# Patient Record
Sex: FEMALE | Race: ASIAN | NOT HISPANIC OR LATINO | URBAN - NONMETROPOLITAN AREA
[De-identification: names, ages, dates, MRNs, and addresses within clinical notes are randomized per-mention and may not be internally consistent; named-entity substitution may affect disease eponyms.]

---

## 2023-03-20 ENCOUNTER — OFFICE VISIT (OUTPATIENT)
Dept: PRIMARY CARE | Facility: CLINIC | Age: 28
End: 2023-03-20
Payer: COMMERCIAL

## 2023-03-20 VITALS
BODY MASS INDEX: 19.07 KG/M2 | HEART RATE: 54 BPM | WEIGHT: 101 LBS | SYSTOLIC BLOOD PRESSURE: 110 MMHG | OXYGEN SATURATION: 99 % | HEIGHT: 61 IN | DIASTOLIC BLOOD PRESSURE: 64 MMHG

## 2023-03-20 DIAGNOSIS — K59.00 CONSTIPATION, UNSPECIFIED CONSTIPATION TYPE: ICD-10-CM

## 2023-03-20 DIAGNOSIS — K62.5 RECTAL BLEEDING: ICD-10-CM

## 2023-03-20 DIAGNOSIS — T73.2XXS FATIGUE DUE TO EXPOSURE, SEQUELA: Primary | ICD-10-CM

## 2023-03-20 PROBLEM — T73.2XXA FATIGUE DUE TO EXPOSURE: Status: ACTIVE | Noted: 2023-03-20

## 2023-03-20 PROCEDURE — 1036F TOBACCO NON-USER: CPT | Performed by: INTERNAL MEDICINE

## 2023-03-20 PROCEDURE — 99203 OFFICE O/P NEW LOW 30 MIN: CPT | Performed by: INTERNAL MEDICINE

## 2023-03-20 RX ORDER — NORGESTIMATE AND ETHINYL ESTRADIOL 7DAYSX3 28
1 KIT ORAL DAILY
COMMUNITY
Start: 2023-01-04

## 2023-03-20 ASSESSMENT — ENCOUNTER SYMPTOMS
ABDOMINAL PAIN: 0
UNEXPECTED WEIGHT CHANGE: 0
PALPITATIONS: 0
WHEEZING: 0
LIGHT-HEADEDNESS: 1
BLOOD IN STOOL: 0
CHEST TIGHTNESS: 0
BACK PAIN: 0
FATIGUE: 1
COUGH: 0
NAUSEA: 0
ARTHRALGIAS: 0
VOMITING: 0
SHORTNESS OF BREATH: 0
DIARRHEA: 0

## 2023-03-20 NOTE — PROGRESS NOTES
Pt is here today to get est with new PCP has C/O rectal bleeding for the last few months. Would like to talk about getting pap test done.

## 2023-03-20 NOTE — ASSESSMENT & PLAN NOTE
She has had some rectal bleeding for the past 4 months and therefore I am referring her for a colonoscopy

## 2023-03-20 NOTE — PATIENT INSTRUCTIONS
As we discussed because of your history of bleeding and fatigue I am asking that you go for lab work at your earliest convenience.  In the lab work there is a blood sugar so ideally we would like for you to be fasting.  Fasting for me means eating nothing with calories for at least 8 hours.  You are welcome to have all the water that you want and in fact I encourage drinking lots of water before your lab draw.  You can even have coffee as long as you do not put sugar cream and it  I am also recommending that you start a daily fiber supplement such as Metamucil.  Metamucil is the name brand for a fiber derivative called psyllium.  You can either get the powder you mix in water or take capsules.  Please take the recommended once daily dose starting tomorrow every day until I see you next time  I am referring you to Dr. Megan Sippy a general surgeon in [facility] for evaluation of your rectal bleeding.  Because you have had it for so long I am pretty sure that she will be recommending a colonoscopy.  I will see you back in follow-up

## 2023-03-20 NOTE — PROGRESS NOTES
"Subjective   Patient ID: Marjorie Gilliland is a 27 y.o. female who presents for No chief complaint on file..    HPI   She is here today to get established and explains that over the past 4 months she has noticed some bright red rectal bleeding.  She states that recently it has seemingly stopped but obviously she wants to get checked out.  She also complains of some fatigue.  She also states that she is scheduled to get in for a Pap exam in the near future.  We did conduct a review of systems and we also went over her past medical history.  She is taking no over-the-counter NSAIDs but is on an oral contraceptive.  We discussed investigating her symptoms further and I will be having her go for lab work.  She also understands that I am recommending she have a colonoscopy and she is agreeable.  We will see her back in follow-up.  Review of Systems   Constitutional:  Positive for fatigue. Negative for unexpected weight change.   HENT:  Positive for congestion.    Respiratory:  Negative for cough, chest tightness, shortness of breath and wheezing.    Cardiovascular:  Negative for chest pain, palpitations and leg swelling.   Gastrointestinal:  Negative for abdominal pain, blood in stool, diarrhea, nausea and vomiting.   Genitourinary:  Negative for pelvic pain and vaginal bleeding.   Musculoskeletal:  Negative for arthralgias and back pain.   Neurological:  Positive for light-headedness.   Objective   /64   Pulse 54   Ht 1.549 m (5' 1\")   Wt 45.8 kg (101 lb)   SpO2 99%   BMI 19.08 kg/m²     Physical Exam  Vitals and nursing note reviewed.   Constitutional:       General: She is not in acute distress.     Appearance: Normal appearance.   HENT:      Head: Normocephalic and atraumatic.   Eyes:      Conjunctiva/sclera: Conjunctivae normal.   Cardiovascular:      Rate and Rhythm: Normal rate and regular rhythm.      Heart sounds: Normal heart sounds.   Pulmonary:      Effort: No respiratory distress.      Breath " sounds: No wheezing.   Abdominal:      Palpations: Abdomen is soft.      Tenderness: There is no abdominal tenderness. There is no guarding.   Musculoskeletal:         General: No swelling. Normal range of motion.   Skin:     General: Skin is warm and dry.   Neurological:      General: No focal deficit present.      Mental Status: She is alert and oriented to person, place, and time.   Psychiatric:         Behavior: Behavior normal.         Assessment/Plan   Problem List Items Addressed This Visit          Digestive    Rectal bleeding     She has had some rectal bleeding for the past 4 months and therefore I am referring her for a colonoscopy         Relevant Orders    Referral to General Surgery    Constipation     I am recommending that she start a fiber derivative daily until her next follow-up appointment with me            Other    Fatigue due to exposure - Primary     I am sending her for laboratory testing to include a CBC, TSH, and CMP         Relevant Orders    Comprehensive Metabolic Panel    CBC and Auto Differential    TSH

## 2023-03-20 NOTE — ASSESSMENT & PLAN NOTE
I am recommending that she start a fiber derivative daily until her next follow-up appointment with me

## 2023-05-01 ENCOUNTER — HOSPITAL ENCOUNTER (OUTPATIENT)
Dept: DATA CONVERSION | Facility: HOSPITAL | Age: 28
End: 2023-05-01
Attending: SURGERY | Admitting: SURGERY
Payer: COMMERCIAL

## 2023-05-01 DIAGNOSIS — K59.00 CONSTIPATION, UNSPECIFIED: ICD-10-CM

## 2023-05-01 DIAGNOSIS — K64.4 RESIDUAL HEMORRHOIDAL SKIN TAGS: ICD-10-CM

## 2023-05-01 DIAGNOSIS — K62.5 HEMORRHAGE OF ANUS AND RECTUM: ICD-10-CM

## 2023-05-18 ENCOUNTER — OFFICE VISIT (OUTPATIENT)
Dept: PRIMARY CARE | Facility: CLINIC | Age: 28
End: 2023-05-18
Payer: COMMERCIAL

## 2023-05-18 VITALS
DIASTOLIC BLOOD PRESSURE: 66 MMHG | WEIGHT: 98.2 LBS | BODY MASS INDEX: 18.54 KG/M2 | SYSTOLIC BLOOD PRESSURE: 112 MMHG | OXYGEN SATURATION: 99 % | HEIGHT: 61 IN | HEART RATE: 81 BPM

## 2023-05-18 DIAGNOSIS — K59.00 CONSTIPATION, UNSPECIFIED CONSTIPATION TYPE: ICD-10-CM

## 2023-05-18 DIAGNOSIS — K62.5 RECTAL BLEEDING: Primary | ICD-10-CM

## 2023-05-18 PROBLEM — T73.2XXA FATIGUE DUE TO EXPOSURE: Status: RESOLVED | Noted: 2023-03-20 | Resolved: 2023-05-18

## 2023-05-18 PROCEDURE — 1036F TOBACCO NON-USER: CPT | Performed by: INTERNAL MEDICINE

## 2023-05-18 PROCEDURE — 99213 OFFICE O/P EST LOW 20 MIN: CPT | Performed by: INTERNAL MEDICINE

## 2023-05-18 ASSESSMENT — ENCOUNTER SYMPTOMS
BACK PAIN: 0
ARTHRALGIAS: 0
NAUSEA: 0
DIARRHEA: 0
CHEST TIGHTNESS: 0
PALPITATIONS: 0
ABDOMINAL PAIN: 0
VOMITING: 0
BLOOD IN STOOL: 0
FATIGUE: 0
SHORTNESS OF BREATH: 0
COUGH: 0
WHEEZING: 0

## 2023-05-18 NOTE — PROGRESS NOTES
Subjective   Patient ID: Marjorie Gilliland is a 28 y.o. female who presents for No chief complaint on file..  HPI  She is here today for follow-up.  Since her last visit she was able to complete her colonoscopy and they did not find anything of a serious nature.  She states there were no polyps and they did no biopsies.  Unfortunately just a couple of days after her procedure she started experiencing some rectal bleeding.  She states she was able to see that there is some sort of tear and she did call the gastroenterologist.  She will be seeing them tomorrow for a follow-up.  We also discussed constipation and she is taking the Metamucil regularly.  We also reviewed her most recent laboratory test results and I was pleased to inform her that everything is looking normal.  We did conduct a review of systems and we discussed the importance of getting in for routine Pap exams but otherwise as long as she is doing well we will see her back on an as-needed basis  Review of Systems   Constitutional:  Negative for fatigue.   Respiratory:  Negative for cough, chest tightness, shortness of breath and wheezing.    Cardiovascular:  Negative for chest pain, palpitations and leg swelling.   Gastrointestinal:  Negative for abdominal pain, blood in stool, diarrhea, nausea and vomiting.   Musculoskeletal:  Negative for arthralgias and back pain.     Objective   Physical Exam  Vitals and nursing note reviewed.   Constitutional:       General: She is not in acute distress.     Appearance: Normal appearance.   HENT:      Head: Normocephalic and atraumatic.   Eyes:      Conjunctiva/sclera: Conjunctivae normal.   Cardiovascular:      Rate and Rhythm: Normal rate and regular rhythm.      Heart sounds: Normal heart sounds.   Pulmonary:      Effort: No respiratory distress.      Breath sounds: No wheezing.   Abdominal:      Palpations: Abdomen is soft.      Tenderness: There is no abdominal tenderness. There is no guarding.    Musculoskeletal:         General: No swelling. Normal range of motion.   Skin:     General: Skin is warm and dry.   Neurological:      General: No focal deficit present.      Mental Status: She is alert and oriented to person, place, and time.   Psychiatric:         Behavior: Behavior normal.       Assessment/Plan   Problem List Items Addressed This Visit          Digestive    Rectal bleeding - Primary     -Recent colonoscopy showed no significant abnormalities and she does have a follow-up with gastroenterology tomorrow         Constipation     -She will continue with fiber therapy and we encouraged her to drink lots of water               Mignon Euceda, DO

## 2023-05-18 NOTE — PATIENT INSTRUCTIONS
Please do not hesitate to contact us if you are having any problems and please remember to get in for your regular Pap examination  We talked about the use of fiber therapy as well as Colace and MiraLAX.  She will avoid laxatives

## 2023-05-18 NOTE — ASSESSMENT & PLAN NOTE
-Recent colonoscopy showed no significant abnormalities and she does have a follow-up with gastroenterology tomorrow

## 2023-09-14 NOTE — H&P
History & Physical Reviewed:   Pregnant/Lactating:  ·  Are You Pregnant no   ·  Are You Currently Breastfeeding no     I have reviewed the History and Physical dated:  20-Apr-2023   History and Physical reviewed and relevant findings noted. Patient examined to review pertinent physical  findings.: No significant changes   Home Medications Reviewed: no changes noted   Allergies Reviewed: no changes noted       ERAS (Enhanced Recovery After Surgery):  ·  ERAS Patient: no     Consent:   COVID-19 Consent:  ·  COVID-19 Risk Consent Surgeon has reviewed key risks related to the risk of david COVID-19 and if they contract COVID-19 what the risks are.       Electronic Signatures:  Melissa Vo)  (Signed 01-May-2023 06:54)   Authored: History & Physical Reviewed, ERAS, Consent,  Note Completion      Last Updated: 01-May-2023 06:54 by Melissa Vo)

## 2023-11-06 ENCOUNTER — OFFICE VISIT (OUTPATIENT)
Dept: PRIMARY CARE | Facility: CLINIC | Age: 28
End: 2023-11-06
Payer: COMMERCIAL

## 2023-11-06 VITALS
SYSTOLIC BLOOD PRESSURE: 110 MMHG | DIASTOLIC BLOOD PRESSURE: 60 MMHG | BODY MASS INDEX: 18.97 KG/M2 | HEART RATE: 92 BPM | WEIGHT: 100.4 LBS | OXYGEN SATURATION: 99 %

## 2023-11-06 DIAGNOSIS — J40 BRONCHITIS: Primary | ICD-10-CM

## 2023-11-06 PROBLEM — R05.9 COUGH: Status: ACTIVE | Noted: 2023-10-20

## 2023-11-06 PROBLEM — K59.00 CONSTIPATION: Status: RESOLVED | Noted: 2023-03-20 | Resolved: 2023-11-06

## 2023-11-06 PROBLEM — K60.2 ANAL FISSURE: Status: RESOLVED | Noted: 2023-11-06 | Resolved: 2023-11-06

## 2023-11-06 PROCEDURE — 1036F TOBACCO NON-USER: CPT | Performed by: INTERNAL MEDICINE

## 2023-11-06 PROCEDURE — 99213 OFFICE O/P EST LOW 20 MIN: CPT | Performed by: INTERNAL MEDICINE

## 2023-11-06 RX ORDER — BENZONATATE 200 MG/1
200 CAPSULE ORAL 3 TIMES DAILY PRN
Qty: 42 CAPSULE | Refills: 0 | Status: SHIPPED | OUTPATIENT
Start: 2023-11-06 | End: 2023-11-21 | Stop reason: WASHOUT

## 2023-11-06 RX ORDER — METHYLPREDNISOLONE 4 MG/1
TABLET ORAL
Qty: 21 TABLET | Refills: 0 | Status: SHIPPED | OUTPATIENT
Start: 2023-11-06 | End: 2023-11-13

## 2023-11-06 RX ORDER — ALBUTEROL SULFATE 90 UG/1
2 AEROSOL, METERED RESPIRATORY (INHALATION) EVERY 4 HOURS PRN
COMMUNITY
Start: 2023-11-02 | End: 2023-12-02

## 2023-11-06 ASSESSMENT — ENCOUNTER SYMPTOMS
ARTHRALGIAS: 0
ABDOMINAL PAIN: 0
SINUS PAIN: 0
BLOOD IN STOOL: 0
WHEEZING: 0
SHORTNESS OF BREATH: 1
CHEST TIGHTNESS: 0
SORE THROAT: 0
VOMITING: 0
NAUSEA: 0
PALPITATIONS: 0
FATIGUE: 0
SPUTUM PRODUCTION: 1
RHINORRHEA: 1
FEVER: 0
COUGH: 0
BACK PAIN: 0
DIARRHEA: 0
SINUS PRESSURE: 0

## 2023-11-06 NOTE — PROGRESS NOTES
Subjective   Patient ID: Marjorie Gilliland is a 28 y.o. female who presents for No chief complaint on file..  Shortness of Breath  This is a new problem. The current episode started 1 to 4 weeks ago. The problem occurs constantly. The problem has been gradually improving. The average episode lasts 5 minutes. Associated symptoms include coryza, rhinorrhea and sputum production. Pertinent negatives include no abdominal pain, chest pain, ear pain, fever, leg swelling, sore throat, vomiting or wheezing. The symptoms are aggravated by any activity.   HISTORY PER PATIENT ABOVE  She is here today for evaluation of a persistent cough and some shortness of breath.  She explains that she developed some respiratory symptoms back on October 20 and she developed a high fever of 106.  She states she tried to wait it out but then eventually went to urgent care.  She was advised that she had a virus and was prescribed an albuterol inhaler.  In some ways her symptoms have improved with no longer experiencing fever and she is not producing any discolored mucus etc.  We did conduct a full review of systems based on today's evaluation I think she has a postinfectious cough from bronchitis.  She did do a COVID test once but we both agreed she likely had COVID.  On today's examination her oxygen level is excellent and her lung sounds are clear right now.  We discussed trying a Medrol Dosepak for inflammation and I am also giving her cough medicine.  If she does not get better in the next week or so she will call and we agreed that we can do a chest x-ray.  I will outline everything in a problem based format  Review of Systems   Constitutional:  Negative for fatigue and fever.   HENT:  Positive for congestion and rhinorrhea. Negative for ear pain, postnasal drip, sinus pressure, sinus pain, sneezing and sore throat.    Respiratory:  Positive for sputum production and shortness of breath. Negative for cough, chest tightness and  wheezing.    Cardiovascular:  Negative for chest pain, palpitations and leg swelling.   Gastrointestinal:  Negative for abdominal pain, blood in stool, diarrhea, nausea and vomiting.   Musculoskeletal:  Negative for arthralgias and back pain.     Objective   Physical Exam  Vitals and nursing note reviewed.   Constitutional:       General: She is not in acute distress.     Appearance: Normal appearance.   HENT:      Head: Normocephalic and atraumatic.   Eyes:      Conjunctiva/sclera: Conjunctivae normal.   Cardiovascular:      Rate and Rhythm: Normal rate and regular rhythm.      Heart sounds: Normal heart sounds.   Pulmonary:      Effort: No respiratory distress.      Breath sounds: No wheezing.   Abdominal:      Palpations: Abdomen is soft.      Tenderness: There is no abdominal tenderness. There is no guarding.   Musculoskeletal:         General: No swelling. Normal range of motion.   Skin:     General: Skin is warm and dry.   Neurological:      General: No focal deficit present.      Mental Status: She is alert and oriented to person, place, and time.   Psychiatric:         Behavior: Behavior normal.       Assessment/Plan   Problem List Items Addressed This Visit             ICD-10-CM    Bronchitis - Primary J40     -Symptoms began on October 20 with high fever  -I strongly suspect that she had a viral infection and possibly COVID  -Some of her symptoms have improved but she continues to have some feelings of shortness of breath and cough  -I am giving her a Medrol dose pack  -She has an albuterol inhaler  -I am giving her Tessalon for cough  -She will call if her condition does not continue to improve or does not resolve in the next 10 to 14 days as we would then pursue a chest x-ray         Relevant Medications    methylPREDNISolone (Medrol Dospak) 4 mg tablets    benzonatate (Tessalon) 200 mg capsule          Mignon Euceda DO

## 2023-11-06 NOTE — PATIENT INSTRUCTIONS
As we discussed I firmly believe that you had a recent viral respiratory infection and in some ways your condition has improved but I decided to give you a Medrol Dosepak which is an anti-inflammatory medication.  Will help with the inflammation in your lungs and hopefully help you get better quicker.  I also sent a prescription to your pharmacy for cough medicine called Tessalon and please use as directed.  You can also safely take over-the-counter Robitussin with this medicine if necessary and please also use your Proventil inhaler as it can help with bronchospasm.  Please contact me if your condition does not continue to improve and certainly call right away if you feel like it is getting worse.  We decided that if you had a persistent cough for the next 10 to 14 days you would call and we would do a chest x-ray at that time.

## 2023-11-06 NOTE — ASSESSMENT & PLAN NOTE
-Symptoms began on October 20 with high fever  -I strongly suspect that she had a viral infection and possibly COVID  -Some of her symptoms have improved but she continues to have some feelings of shortness of breath and cough  -I am giving her a Medrol dose pack  -She has an albuterol inhaler  -I am giving her Tessalon for cough  -She will call if her condition does not continue to improve or does not resolve in the next 10 to 14 days as we would then pursue a chest x-ray

## 2023-11-20 ENCOUNTER — LAB REQUISITION (OUTPATIENT)
Dept: LAB | Facility: HOSPITAL | Age: 28
End: 2023-11-20
Payer: COMMERCIAL

## 2023-11-20 DIAGNOSIS — J02.9 ACUTE SORE THROAT: Primary | ICD-10-CM

## 2023-11-20 DIAGNOSIS — J02.9 ACUTE PHARYNGITIS, UNSPECIFIED: ICD-10-CM

## 2023-11-20 PROCEDURE — 87651 STREP A DNA AMP PROBE: CPT

## 2023-11-21 ENCOUNTER — OFFICE VISIT (OUTPATIENT)
Dept: PRIMARY CARE | Facility: CLINIC | Age: 28
End: 2023-11-21
Payer: COMMERCIAL

## 2023-11-21 VITALS
DIASTOLIC BLOOD PRESSURE: 60 MMHG | HEART RATE: 84 BPM | SYSTOLIC BLOOD PRESSURE: 124 MMHG | WEIGHT: 97 LBS | BODY MASS INDEX: 18.33 KG/M2 | OXYGEN SATURATION: 98 %

## 2023-11-21 DIAGNOSIS — J02.9 ACUTE PHARYNGITIS, UNSPECIFIED ETIOLOGY: Primary | ICD-10-CM

## 2023-11-21 PROBLEM — K62.5 RECTAL BLEEDING: Status: RESOLVED | Noted: 2023-03-20 | Resolved: 2023-11-21

## 2023-11-21 PROBLEM — J40 BRONCHITIS: Status: RESOLVED | Noted: 2023-11-06 | Resolved: 2023-11-21

## 2023-11-21 LAB — S PYO DNA THROAT QL NAA+PROBE: NOT DETECTED

## 2023-11-21 PROCEDURE — 99213 OFFICE O/P EST LOW 20 MIN: CPT | Performed by: INTERNAL MEDICINE

## 2023-11-21 PROCEDURE — 1036F TOBACCO NON-USER: CPT | Performed by: INTERNAL MEDICINE

## 2023-11-21 RX ORDER — AMOXICILLIN 875 MG/1
875 TABLET, FILM COATED ORAL 2 TIMES DAILY
Qty: 20 TABLET | Refills: 0 | Status: SHIPPED | OUTPATIENT
Start: 2023-11-21 | End: 2023-12-01

## 2023-11-21 ASSESSMENT — ENCOUNTER SYMPTOMS
HOARSE VOICE: 1
NECK PAIN: 0
ABDOMINAL PAIN: 0
DIARRHEA: 0
HEADACHES: 1
STRIDOR: 0
COUGH: 1
FEVER: 1
SWOLLEN GLANDS: 0
SHORTNESS OF BREATH: 1
TROUBLE SWALLOWING: 0
FATIGUE: 1
VOMITING: 0
SORE THROAT: 1

## 2023-11-21 NOTE — PROGRESS NOTES
Answers submitted by the patient for this visit:  Sore Throat Questionnaire (Submitted on 11/21/2023)  Chief Complaint: Sore throat  Chronicity: new  Onset: in the past 7 days  Progression since onset: waxing and waning  Pain worse on: left  Fever: no fever  Fever duration: 1 to 2 days  Pain - numeric: 7/10  abdominal pain: No  congestion: Yes  cough: Yes  diarrhea: No  drooling: No  ear discharge: No  ear pain: No  headaches: Yes  hoarse voice: Yes  neck pain: No  plugged ear sensation: No  shortness of breath: Yes  stridor: No  swollen glands: No  trouble swallowing: No  vomiting: No  strep: No  mono: No

## 2023-11-21 NOTE — PATIENT INSTRUCTIONS
As we discussed we sent an antibiotic to your pharmacy called Amoxil to be taken twice daily with food for the next 10 days  For the sore throat we recommend Tylenol and you could also take ibuprofen but just use sparingly and make sure you also take that with food  Please call if your condition is worsening or does not resolve in the next couple of weeks

## 2023-11-21 NOTE — PROGRESS NOTES
Subjective   Patient ID: Marjorie Gilliland is a 28 y.o. female who presents for Sore Throat.  Sore Throat   This is a new problem. The current episode started in the past 7 days. The problem has been waxing and waning. The pain is worse on the left side. There has been no fever. The fever has been present for 1 to 2 days. The pain is at a severity of 7/10. Associated symptoms include congestion, coughing, headaches, a hoarse voice and shortness of breath. Pertinent negatives include no abdominal pain, diarrhea, drooling, ear discharge, ear pain, plugged ear sensation, neck pain, stridor, swollen glands, trouble swallowing or vomiting. She has had no exposure to strep or mono.   HISTORY PER PT ABOVE  She is here today with an intensely sore throat.  We are reminded that a couple of weeks ago she contracted COVID-19 infection and although she has had a lot of improvement in her symptoms she now has developed a red and intensely sore throat.  She states she is even had a fever with it.  We did conduct a review of systems and based on today's evaluation I do strongly suspect that she is suffering from acute bacterial pharyngitis.  I have decided to place her on amoxicillin and she will take that as directed for the next 10 days.  We also recommend symptomatic treatment with Tylenol and she will call if her condition is not improving or does not resolve as expected.  Review of Systems   Constitutional:  Positive for fatigue and fever.   HENT:  Positive for congestion, hoarse voice and sore throat. Negative for drooling, ear discharge, ear pain and trouble swallowing.    Respiratory:  Positive for cough and shortness of breath. Negative for stridor.    Gastrointestinal:  Negative for abdominal pain, diarrhea and vomiting.   Musculoskeletal:  Negative for neck pain.   Neurological:  Positive for headaches.     Objective   Physical Exam  Vitals and nursing note reviewed.   Constitutional:       General: She is not in  acute distress.     Appearance: Normal appearance.   HENT:      Head: Normocephalic and atraumatic.   Eyes:      Conjunctiva/sclera: Conjunctivae normal.   Cardiovascular:      Rate and Rhythm: Normal rate and regular rhythm.      Heart sounds: Normal heart sounds.   Pulmonary:      Effort: No respiratory distress.      Breath sounds: No wheezing.   Abdominal:      Palpations: Abdomen is soft.      Tenderness: There is no abdominal tenderness. There is no guarding.   Musculoskeletal:         General: No swelling. Normal range of motion.   Skin:     General: Skin is warm and dry.   Neurological:      General: No focal deficit present.      Mental Status: She is alert and oriented to person, place, and time.   Psychiatric:         Behavior: Behavior normal.       Assessment/Plan   Problem List Items Addressed This Visit             ICD-10-CM    Acute pharyngitis - Primary J02.9     -I am sending amoxicillin to her pharmacy and she will take this as directed twice daily for 10 days  -A couple of weeks ago she had COVID-19 infection         Relevant Medications    amoxicillin (Amoxil) 875 mg tablet          Mignon Euceda DO

## 2023-11-21 NOTE — ASSESSMENT & PLAN NOTE
-I am sending amoxicillin to her pharmacy and she will take this as directed twice daily for 10 days  -A couple of weeks ago she had COVID-19 infection

## 2024-03-07 ENCOUNTER — OFFICE VISIT (OUTPATIENT)
Dept: PRIMARY CARE | Facility: CLINIC | Age: 29
End: 2024-03-07
Payer: COMMERCIAL

## 2024-03-07 VITALS
OXYGEN SATURATION: 99 % | DIASTOLIC BLOOD PRESSURE: 60 MMHG | SYSTOLIC BLOOD PRESSURE: 118 MMHG | BODY MASS INDEX: 18.1 KG/M2 | HEART RATE: 83 BPM | WEIGHT: 95.8 LBS

## 2024-03-07 DIAGNOSIS — K13.0 CHEILITIS: Primary | ICD-10-CM

## 2024-03-07 PROBLEM — J02.9 ACUTE PHARYNGITIS: Status: RESOLVED | Noted: 2023-11-21 | Resolved: 2024-03-07

## 2024-03-07 PROCEDURE — 1036F TOBACCO NON-USER: CPT | Performed by: INTERNAL MEDICINE

## 2024-03-07 PROCEDURE — 99213 OFFICE O/P EST LOW 20 MIN: CPT | Performed by: INTERNAL MEDICINE

## 2024-03-07 RX ORDER — CLOTRIMAZOLE AND BETAMETHASONE DIPROPIONATE 10; .64 MG/G; MG/G
1 CREAM TOPICAL 2 TIMES DAILY
Qty: 30 G | Refills: 1 | Status: SHIPPED | OUTPATIENT
Start: 2024-03-07 | End: 2024-03-17

## 2024-03-07 ASSESSMENT — ENCOUNTER SYMPTOMS
BLOOD IN STOOL: 0
VOMITING: 0
FATIGUE: 0
NAUSEA: 0
WHEEZING: 0
ARTHRALGIAS: 0
DIARRHEA: 0
SHORTNESS OF BREATH: 0
FEVER: 0
ABDOMINAL PAIN: 0
PALPITATIONS: 0
COUGH: 0

## 2024-03-07 NOTE — PROGRESS NOTES
"Subjective   Patient ID: Marjorie Gilliland \"\" is a 28 y.o. female who presents for chapped lips (Swollen,painful).  HPI  She is today with irritation on and around her lips which she states has been on and off for probably the past year.  She states that sometimes it resolves and sometimes it is severe and she been trying to figure out what is behind it.  She does wear a flipper which she was prescribed about 6 months ago so it is not a new product but she did discuss this with her dentist and his recommendation was to make sure that it was thoroughly cleaned.  She states she also uses Chapstick on her lips and she did change her toothpaste recently but this symptom began long before the new toothpaste.  We talked about various possible culprits including the brightening and whitening products in toothpaste.  I told her I want her to get a milder toothpaste that does not have those products and I am recommending plain Pepsodent.  We also discussed avoiding mouthwash at least temporarily until we figure out what is going on.  I am also giving her a Lotrisone cream to apply around her lips but not too close to her mouth.  I am also asking that she refrain from putting any products on her lips such as the Chapstick.  I told her to give me an update in 1 to 2 weeks on how she is doing and if she does not get better we will definitely come up with another plan.  Review of Systems   Constitutional:  Negative for fatigue and fever.   Respiratory:  Negative for cough, shortness of breath and wheezing.    Cardiovascular:  Negative for chest pain, palpitations and leg swelling.   Gastrointestinal:  Negative for abdominal pain, blood in stool, diarrhea, nausea and vomiting.   Musculoskeletal:  Negative for arthralgias.     Objective   Physical Exam  Vitals and nursing note reviewed.   Constitutional:       General: She is not in acute distress.     Appearance: Normal appearance.   HENT:      Head: Normocephalic and " atraumatic.   Eyes:      Conjunctiva/sclera: Conjunctivae normal.   Cardiovascular:      Rate and Rhythm: Normal rate and regular rhythm.      Heart sounds: Normal heart sounds.   Pulmonary:      Effort: No respiratory distress.      Breath sounds: No wheezing.   Abdominal:      Palpations: Abdomen is soft.      Tenderness: There is no abdominal tenderness. There is no guarding.   Musculoskeletal:         General: No swelling. Normal range of motion.   Skin:     General: Skin is warm and dry.   Neurological:      General: No focal deficit present.      Mental Status: She is alert and oriented to person, place, and time.   Psychiatric:         Behavior: Behavior normal.       Assessment/Plan   Problem List Items Addressed This Visit             ICD-10-CM    Cheilitis - Primary K13.0     -She will use a milder form of toothpaste  -She will refrain from mouthwashes  -She will refrain from using Chapstick or other topical moisturizing agents  -I am prescribing Lotrisone cream to put on around the lips but not to close to the inside of her mouth  -She will discuss how to clean her flipper with her dentist and I have suggested hydrogen peroxide if that is allowed  -She will let me know how she is doing in the next 1 to 2 weeks         Relevant Medications    clotrimazole-betamethasone (Lotrisone) cream          Mignon Euceda, DO

## 2024-03-07 NOTE — PATIENT INSTRUCTIONS
As we discussed I would like for you to get a milder form of toothpaste and I recommend the brand of Pepsodent.  Get the portion that does not say whitening or writing on it and use this to brush her teeth  Refrain from using any mouth rinses  Refrain from using any topical moisturizing agents such as Chapstick  Use the cream that I have given you via pharmacy around your mouth and a little bit on your lips but do not get it inside your mouth and use a very small amount twice a day for the next 10 days  Discussed how to clean your flipper and I do recommend hydrogen peroxide  Please give me an update in approximately 2 weeks and please call sooner if your condition is worsening as opposed to getting better

## 2024-03-07 NOTE — ASSESSMENT & PLAN NOTE
-She will use a milder form of toothpaste  -She will refrain from mouthwashes  -She will refrain from using Chapstick or other topical moisturizing agents  -I am prescribing Lotrisone cream to put on around the lips but not to close to the inside of her mouth  -She will discuss how to clean her flipper with her dentist and I have suggested hydrogen peroxide if that is allowed  -She will let me know how she is doing in the next 1 to 2 weeks

## 2024-11-08 ENCOUNTER — APPOINTMENT (OUTPATIENT)
Age: 29
End: 2024-11-08
Payer: COMMERCIAL

## 2024-11-08 VITALS
HEART RATE: 75 BPM | DIASTOLIC BLOOD PRESSURE: 72 MMHG | OXYGEN SATURATION: 100 % | SYSTOLIC BLOOD PRESSURE: 106 MMHG | BODY MASS INDEX: 18.88 KG/M2 | HEIGHT: 61 IN | WEIGHT: 100 LBS

## 2024-11-08 DIAGNOSIS — J34.89 NASAL OBSTRUCTION: Primary | ICD-10-CM

## 2024-11-08 PROCEDURE — 1036F TOBACCO NON-USER: CPT | Performed by: INTERNAL MEDICINE

## 2024-11-08 PROCEDURE — 3008F BODY MASS INDEX DOCD: CPT | Performed by: INTERNAL MEDICINE

## 2024-11-08 PROCEDURE — 99213 OFFICE O/P EST LOW 20 MIN: CPT | Performed by: INTERNAL MEDICINE

## 2024-11-08 ASSESSMENT — ENCOUNTER SYMPTOMS
FATIGUE: 0
COUGH: 0
PALPITATIONS: 0
BLOOD IN STOOL: 0
SHORTNESS OF BREATH: 0
FEVER: 0
VOMITING: 0
ABDOMINAL PAIN: 0
NAUSEA: 0
WHEEZING: 0
BACK PAIN: 0
DIARRHEA: 0

## 2024-11-08 NOTE — PATIENT INSTRUCTIONS
As we discussed I am having the staff arrange for you to see ENT here in town for your nose  -If you can try to wean off the Synex by going back to your Flonase  ENT will certainly come up with a solution for your condition and hopefully we can get you in soon

## 2024-11-08 NOTE — PROGRESS NOTES
"Subjective   Patient ID: Marjorie Gilliland \"\" is a 29 y.o. female who presents for Follow-up.  HPI  She is here today with almost complete obstruction in her nose.  She explains that she has had problems with allergies and will living in [place] she was treating herself with Flonase nasal spray.  She moved to [place] in May and it appears that her allergies have worsened over time.  She states she is also living in a new home which she thought might be a factor.  She is using Synex and she states she has difficulty even breathing through her nostrils.  On today's examination she has a lot of obstruction with swelling of the nasal turbinates.  She states that the Flonase really was not all that helpful.  We talked about the problems with the vasoconstricting medications like Synex.  The bottom line is I am going to refer her to ENT for further evaluation and treatment and I did advise that she may want to try to wean off the Synex and maybe go back to the Flonase to see if it will help at least a little bit until she gets them.  She is also been using the Lotrisone for the corners of her mouth which has worked very well and I told her to use it as needed.  Review of Systems   Constitutional:  Negative for fatigue and fever.   HENT:  Positive for congestion.    Respiratory:  Negative for cough, shortness of breath and wheezing.    Cardiovascular:  Negative for chest pain, palpitations and leg swelling.   Gastrointestinal:  Negative for abdominal pain, blood in stool, diarrhea, nausea and vomiting.   Musculoskeletal:  Negative for back pain.     Objective   Physical Exam  Vitals and nursing note reviewed.   Constitutional:       General: She is not in acute distress.     Appearance: Normal appearance.   HENT:      Head: Normocephalic and atraumatic.   Eyes:      Conjunctiva/sclera: Conjunctivae normal.   Cardiovascular:      Rate and Rhythm: Normal rate and regular rhythm.      Heart sounds: Normal heart " sounds.   Pulmonary:      Effort: No respiratory distress.      Breath sounds: No wheezing.   Abdominal:      Palpations: Abdomen is soft.      Tenderness: There is no abdominal tenderness. There is no guarding.   Musculoskeletal:         General: No swelling. Normal range of motion.   Skin:     General: Skin is warm and dry.   Neurological:      General: No focal deficit present.      Mental Status: She is alert and oriented to person, place, and time.   Psychiatric:         Behavior: Behavior normal.       Assessment/Plan   Problem List Items Addressed This Visit             ICD-10-CM    Nasal obstruction - Primary J34.89     -Unfortunately she has bilateral nostril obstruction from swelling of the mucous membranes  -I am referring her to ENT for additional evaluation         Relevant Orders    Referral to ENT          [facility]

## 2024-11-08 NOTE — ASSESSMENT & PLAN NOTE
-Unfortunately she has bilateral nostril obstruction from swelling of the mucous membranes  -I am referring her to ENT for additional evaluation